# Patient Record
Sex: MALE | Race: BLACK OR AFRICAN AMERICAN | NOT HISPANIC OR LATINO | Employment: OTHER | URBAN - METROPOLITAN AREA
[De-identification: names, ages, dates, MRNs, and addresses within clinical notes are randomized per-mention and may not be internally consistent; named-entity substitution may affect disease eponyms.]

---

## 2024-09-30 ENCOUNTER — OFFICE VISIT (OUTPATIENT)
Dept: NEUROLOGY | Facility: CLINIC | Age: 24
End: 2024-09-30
Payer: COMMERCIAL

## 2024-09-30 VITALS
DIASTOLIC BLOOD PRESSURE: 71 MMHG | RESPIRATION RATE: 18 BRPM | WEIGHT: 211 LBS | SYSTOLIC BLOOD PRESSURE: 122 MMHG | HEART RATE: 90 BPM

## 2024-09-30 DIAGNOSIS — R56.9 SEIZURE (MULTI): Primary | ICD-10-CM

## 2024-09-30 PROCEDURE — 99214 OFFICE O/P EST MOD 30 MIN: CPT | Performed by: PSYCHIATRY & NEUROLOGY

## 2024-09-30 PROCEDURE — 1036F TOBACCO NON-USER: CPT | Performed by: PSYCHIATRY & NEUROLOGY

## 2024-09-30 PROCEDURE — 99204 OFFICE O/P NEW MOD 45 MIN: CPT | Performed by: PSYCHIATRY & NEUROLOGY

## 2024-09-30 RX ORDER — LAMOTRIGINE 25 MG/1
50 TABLET ORAL 2 TIMES DAILY
COMMUNITY
Start: 2024-06-05

## 2024-09-30 RX ORDER — LACOSAMIDE 100 MG/1
100 TABLET ORAL 2 TIMES DAILY
Qty: 180 TABLET | Refills: 3 | Status: SHIPPED | OUTPATIENT
Start: 2024-09-30 | End: 2025-09-25

## 2024-09-30 RX ORDER — SODIUM BICARBONATE 650 MG/1
650 TABLET ORAL 2 TIMES DAILY
COMMUNITY
Start: 2024-06-05

## 2024-09-30 ASSESSMENT — PAIN SCALES - GENERAL: PAINLEVEL: 0-NO PAIN

## 2024-09-30 NOTE — PATIENT INSTRUCTIONS
You were seen in Epilepsy clinic today with Dr. Arango,    -Lamotrigine 25mg twice daily for 1 week in addition to the Lacosamide 100mg twice daily  -After 1 week stop taking the lamotrigine all together, just take the Lacosamide 100mg twice daily  -Obtain an EKG to assess for abnormal heart rhythms  -Return to clinic in 3 months

## 2024-09-30 NOTE — PROGRESS NOTES
"24 y.o.male with a hx of autism, presenting in initial consultation for epilepsy.    Per chart review, it appears pt has experienced seizures since . He initially had an event in  where he dislocated his shoulder. His grandmother, who is pt's caregiver heard a \"thud\" and found him on his back in the other room, pale and diaphoretic. His eyes at the time appeared to roll back and he \"shivered\" for a few seconds. This was on and off for 3-4 minutes, no bowel/bladder incontinence or ictal tongue bite, no versions. No post-ictal confusion. Initially this was thought to be a syncopal event. The events continued to occur after the initial, requiring additional presentation to [facility]. He was admitted, MRI and EEG obtained which were overall unremarkable. Thought to be syncopal events, and pt was discharged without ASMs.     Established care in the [facility] Epilepsy center (Dr. Memo Horner) in 2021. Here pt was started on Keppra 750mg BID, then switched to Lamictal at pt's last tele-visit (2022). 2021 pt had breakthrough seizure, Lamictal was increased 50->100mg BID, however family noted adverse effects. They stated pt was a \"zombie\" on increased dose, not able to walk, sweating, and spitting.     Pt with recent seizure 24 in the rear seat of a car ictal tongue biting, and resulted in presentation to the [facility] ED, where he was stable and not admitted. Prior to this, pt was admitted to [facility] -24 for breakthrough seizure 2/2 medication noncompliance. Here it was decided to continue pt's Lamictal 25mg BID and increase to 50mg BID after one week.     Last Lamictal level was 2.5 24.    4D CLASSIFICATION -   Semiology: generalized Tonic->Clonic  Onset:   Frequency: 4 total seizures since   Last event: 24 pt presented to [facility] ED after having a seizure in the back of the car  EZ: Unknown  Etiology: Unknown  Comorbidities: Autism, MRDD    Workup:  EE2021 EEG IMPRESSIONS: This is a mildly " abnormal awake and drowsy electroencephalogram consistent with a mild encephalopathy.  There is no evidence for focal abnormality or epileptiform activity.  AMU: None  CTH: 12/2021: No acute intracranial process is identified. No acute facial bone fracture is identified. No acute cervical spine fracture is identified.   MRI w/wo: 04/20/2021 MRI brain with and without -IMPRESSION:   No acute intracranial abnormality.  The mesial temporal structures appear   symmetric in morphology, signal intensity and volume.  No abnormal   enhancement.   EKG-FL: None  Prior ASMs: Keppra  Current ASMs: Lamictal 50mg BID  Handedness: Left handed  -------------------------------------------------    ANAMNESIS  PER PATIENT:  Pt states he is going to faint prior to event, no memory of what happens during the event    PER WITNESS:   Passes out, saliva builds up, b/l UE/LE tense up, followed by shaking, 3-4 minutes, post ictal fatigue for rest of day, denies any asymmetry in movement and post-ictally, has not seen pt with abnormal eye movements or head turning      EPILEPSY RISK FACTORS:  Gestation and Birth: born at 9 months, normally   Febrile Seizures: Denies  Milestones: Language delay at 2, dx with Autism and MRDD  CNS Infections: Denies  CNS Surgeries: Denies  Head Trauma: Denies  FHx of Seizures: Denies    Currently Driving?: No  - Hx of MVA? No    REVIEW OF SYSTEMS  ROS: As per HPI. All other systems have been reviewed and are negative for complaint. No changes in the past medical, family, or social histories since the previous review on per the patient.    MEDICAL AND SURGICAL HISTORY  No past medical history on file.  No past surgical history on file.    FAMILY HISTORY  No family history on file.    SOCIAL HISTORY:  Home Situation: Lives with grandmother  Education: Graduated 12th grade  Employment: Unemployed, was working at [employer] until 2022 (6 months of employment until had seizure while working)  Tobacco: None  Alcohol:  None  Marijuana: None  Drugs: None  Caffeine: None  Hobbies: Drawing, video games    ALLERGIES  Not on File    MEDS  No current outpatient medications    EXAM   vitals were not taken for this visit.     General Appearance:  No acute distress, appears stated age.    Mental Status:  The patient was alert, pleasant and cooperative, able to follow 1 step commands, paucity of speech appreciated    Cranial Nerves:  Pupils were symmetrical and reacted briskly to light bilaterally. No visual field cuts were noted on confrontation testing.   EOMI, no pathological nystagmus.   Facial light-touch sensation and motor activation was symmetric bilaterally.   Tongue protruded mildline with intact bilateral movements.   Shoulder shrug symmetric.     Motor Exam:  Muscle tone was normal in upper and lower extremities, proximal and distal bilaterally.   Motor power was 5/5 in bilateral upper and lower extremities.  No tremor noted.    Sensory Exam:   Sensory system was intact. The sensory exam was intact to light touch throughout.    Coordination Exam:  Finger-to-nose and heel-to-shin testing without dysmetria.    Reflex Exam:  Biceps, triceps, brachioradialis, patellar, and achilles reflexes 2+ throughout.     ASSESSMENT AND PLAN  24 y.o.male presenting in initial consultation for episodes concerning for seizures. Sx onset 2021 and following with [facility]. His semiology is described above. Likely epileptic given hx of autism and semiology described as generalized tonic clonic seizures. Seizures controlled on Lamictal 50mg BID until 5/2024, having 2 events in May and August of 2024. Given increased frequency of seizures, will recommend switching anti-seizure medications. Pt unable to tolerate increased Lamictal dose, so will switch to different medication.    -Stop Lamictal, start lacosamide 100mg BID (Lamotrigine weaned off as 25mg BID for 1 week with overlap with lacosamide, followed by lacosamide 100mg BID)  -Recommend  obtaining EKG prior to initiation of lacosamide  -RTC in 3 months    Pt seen and examined with attending Dr. Arango who agrees with assessment and plan,    Jose Coleman, DO  PGY-2 Neurology

## 2025-01-27 ENCOUNTER — OFFICE VISIT (OUTPATIENT)
Dept: NEUROLOGY | Facility: CLINIC | Age: 25
End: 2025-01-27
Payer: COMMERCIAL

## 2025-01-27 VITALS
RESPIRATION RATE: 18 BRPM | WEIGHT: 211 LBS | SYSTOLIC BLOOD PRESSURE: 123 MMHG | DIASTOLIC BLOOD PRESSURE: 66 MMHG | HEART RATE: 82 BPM

## 2025-01-27 DIAGNOSIS — R56.9 SEIZURE (MULTI): ICD-10-CM

## 2025-01-27 PROCEDURE — 99214 OFFICE O/P EST MOD 30 MIN: CPT | Performed by: NURSE PRACTITIONER

## 2025-01-27 PROCEDURE — 1036F TOBACCO NON-USER: CPT | Performed by: NURSE PRACTITIONER

## 2025-01-27 RX ORDER — LACOSAMIDE 100 MG/1
100 TABLET ORAL 2 TIMES DAILY
Qty: 180 TABLET | Refills: 3 | Status: SHIPPED | OUTPATIENT
Start: 2025-01-27 | End: 2026-01-22

## 2025-01-27 ASSESSMENT — PAIN SCALES - GENERAL: PAINLEVEL_OUTOF10: 0-NO PAIN

## 2025-01-27 NOTE — PROGRESS NOTES
24 y.o.male with a hx of autism, presenting in epilepsy follow up.     4D CLASSIFICATION -   Semiology: generalized Tonic->Clonic  Onset:   Frequency: 4 total seizures since   Last event: 24 pt presented to [facility] ED after having a seizure in the back of the car  EZ: Unknown  Etiology: Unknown  Comorbidities: Autism, MRDD    Workup:  EE2021 EEG IMPRESSIONS: This is a mildly abnormal awake and drowsy electroencephalogram consistent with a mild encephalopathy.  There is no evidence for focal abnormality or epileptiform activity.  AMU: None  CTH: 2021: No acute intracranial process is identified. No acute facial bone fracture is identified. No acute cervical spine fracture is identified.   MRI w/wo: 2021 MRI brain with and without -IMPRESSION:   No acute intracranial abnormality.  The mesial temporal structures appear   symmetric in morphology, signal intensity and volume.  No abnormal   enhancement.   EKG-ND: None  Prior ASMs: Keppra, Lamictal   Current ASMs: lacosamide 100-100  Handedness: Left handed  -------------------------------------------------    ANAMNESIS  PER PATIENT:  Pt states he is going to faint prior to event, no memory of what happens during the event    PER WITNESS:   Passes out, saliva builds up, b/l UE/LE tense up, followed by shaking, 3-4 minutes, post ictal fatigue for rest of day, denies any asymmetry in movement and post-ictally, has not seen pt with abnormal eye movements or head turning    PRESENT CONCERNS:   Since transition to -100 he has been doing great. No side effects and no seizures since the transition. He and family member are very happy with the change. No new meds or changes since the last visit,.       EPILEPSY RISK FACTORS:  Gestation and Birth: born at 9 months, normally   Febrile Seizures: Denies  Milestones: Language delay at 2, dx with Autism and MRDD  CNS Infections: Denies  CNS Surgeries: Denies  Head Trauma: Denies  FHx of Seizures:  Denies    Currently Driving?: No  - Hx of MVA? No    REVIEW OF SYSTEMS  ROS: As per HPI. All other systems have been reviewed and are negative for complaint. No changes in the past medical, family, or social histories since the previous review on per the patient.      SOCIAL HISTORY:  Home Situation: Lives with grandmother  Education: Graduated 12th grade  Employment: Unemployed, was working at [employer] until 2022 (6 months of employment until had seizure while working)  Tobacco: None  Alcohol: None  Marijuana: None  Drugs: None  Caffeine: None  Hobbies: Drawing, video games    ALLERGIES  Not on File    MEDS  No current outpatient medications    EXAM  The patient was alert, pleasant and cooperative, able to follow 1 step commands, paucity of speech   Eye movements were intact. Muscles of mastication and facial expression moved normally. Hearing was normal bilaterally.   Coordination in the arms and legs was intact  Involuntary movements: none.  Standard gait was normal    ASSESSMENT AND PLAN  24 y.o.male presenting in follow up episodes concerning for seizures. Sx onset 2021.  His semiology is described above. Likely epileptic given hx of autism and semiology described as generalized tonic clonic seizures. Seizures controlled on Lamictal 50mg BID until 5/2024, having 2 events in May and August of 2024. Given increased frequency of seizures and inability to tolerate higher doses of LTG he was switched to -100. Since then he has had no seizures and no side effects, he is feeling improved on this medication.     -continue -100  -serum level   -RTC in 6 months

## 2025-01-27 NOTE — PATIENT INSTRUCTIONS
"Thank you for coming to the Epilepsy Clinic today.    -If you have any sudden new, concerning or worsening symptoms, call 911 and go to the Emergency Room. Otherwise, it was good seeing you today-  -  Your seizures are well controlled on the current medication. Additional prescription refills was sent to the pharmacy.    As we discussed, because you have not had any events or seizures where you lose awareness or control of your actions you have no restrictions at this time. However, if you have an event of seizure where this were to occur, you must inform our office, and we will reassess things. In this event, you stop driving, using heavy machinery, climbing heights, or engaging in any other activity that would cause harm/death to yourself or others were you to lose awareness/consciousness and have a seizure. These restrictions would need to stay in place until at least 6 months of seizure freedom and clearance your physician.    -HOW TO CONTACT KRISTIAN MILLS EPILEPSY NURSE PRACTITIONER ([phone]).   Instructed to call in the event of seizure, medication refills, or any questions  *Please allow 24-48 hours for non-urgent responses*.  For emergency concerns, please dial 911 or present to the nearest emergency room.  For concerns after business hours (8am-4:30pm) or on weekends please call [phone]  To call and schedule a follow up appointment please call [phone]  -Paperwork may take up to 3 business days to complete-    Every attempt is made to run on time for your appointment, if you are 15 minutes or later for your appoinement you may be asked to reschedule    -Compliance education: It is important to continue to try and achieve seizure control because of the potential for injury and illness due to seizures. In a very small minority of patients with generalized tonic clonic seizures (\"grand mal\"), breathing or heart function can stop during a seizure and result in demise (sudden unexpected death in " epilepsy or SUDEP). Freedom from seizures prevents this kind of outcome-     Please continue to take lacosamide (Vimpat) 100 mg twice a day. At higher doses, you may experience some double vision, dizziness, or drowsiness, though it would be unlikely at your current dose. A small group of patients with pre-existing heart conditions may have worsening of heart rhythms. While you have been stable on this medication, please let me know if you are ever seen or examined for heart issues.     I have ordered blood work for you to have completed at the lab. You do not need to bring any paperwork with you if you are going to a [facility]. The results will automatically come to me and I will call or message you with results.

## 2025-07-28 ENCOUNTER — OFFICE VISIT (OUTPATIENT)
Dept: NEUROLOGY | Facility: CLINIC | Age: 25
End: 2025-07-28
Payer: COMMERCIAL

## 2025-07-28 VITALS — SYSTOLIC BLOOD PRESSURE: 131 MMHG | HEART RATE: 75 BPM | DIASTOLIC BLOOD PRESSURE: 77 MMHG | WEIGHT: 198.41 LBS

## 2025-07-28 DIAGNOSIS — R56.9 SEIZURE (MULTI): ICD-10-CM

## 2025-07-28 PROCEDURE — 99214 OFFICE O/P EST MOD 30 MIN: CPT | Performed by: NURSE PRACTITIONER

## 2025-07-28 RX ORDER — LACOSAMIDE 100 MG/1
100 TABLET ORAL 2 TIMES DAILY
Qty: 180 TABLET | Refills: 1 | Status: SHIPPED | OUTPATIENT
Start: 2025-07-28 | End: 2026-01-24

## 2025-07-28 NOTE — PATIENT INSTRUCTIONS
"Thank you for coming to the Epilepsy Clinic today.  -If you have any sudden new, concerning or worsening symptoms, call 911 and go to the Emergency Room. Otherwise, it was good seeing you today-    -Please follow seizure precautions:   Please do not drive, operate any heavy machinery, swim unsupervised, please shower without collection of water instead of bathe. Be cautious around hot, heavy, or sharp objects. Do not cook with an open flame and do not perform any activities at heights such as on a ladder. These precautions should stay in place until 6 months seizure free and cleared by a provider.    -HOW TO CONTACT KRISTIAN MILLS EPILEPSY NURSE PRACTITIONER ([phone]).   Instructed to call in the event of seizure, medication refills, or any questions  *Please allow 24-48 hours for non-urgent responses*.  For emergency concerns, please dial 911 or present to the nearest emergency room.  For concerns after business hours (8am-4:30pm) or on weekends please call [phone]  To call and schedule a follow up appointment please call [phone]  -Paperwork may take up to 3 business days to complete-    Every attempt is made to run on time for your appointment, if you are 15 minutes or later for your appoinement you may be asked to reschedule    -Compliance education: It is important to continue to try and achieve seizure control because of the potential for injury and illness due to seizures. In a very small minority of patients with generalized tonic clonic seizures (\"grand mal\"), breathing or heart function can stop during a seizure and result in demise (sudden unexpected death in epilepsy or SUDEP). Millis from seizures prevents this kind of outcome-   "

## 2025-07-28 NOTE — PROGRESS NOTES
24 y.o.male with a hx of autism, presenting in epilepsy follow up.     4D CLASSIFICATION -   Semiology: generalized Tonic->Clonic  Onset:   Frequency: 4 total seizures since   Last event: 24 pt presented to [facility] ED after having a seizure in the back of the car  EZ: Unknown  Etiology: Unknown  Comorbidities: Autism, MRDD    Workup:  EE2021 EEG IMPRESSIONS: This is a mildly abnormal awake and drowsy electroencephalogram consistent with a mild encephalopathy.  There is no evidence for focal abnormality or epileptiform activity.  AMU: None  CTH: 2021: No acute intracranial process is identified. No acute facial bone fracture is identified. No acute cervical spine fracture is identified.   MRI w/wo: 2021 MRI brain with and without -IMPRESSION:   No acute intracranial abnormality.  The mesial temporal structures appear   symmetric in morphology, signal intensity and volume.  No abnormal   enhancement.   EKG-GA: None  Prior ASMs: Keppra, Lamictal   Current ASMs: lacosamide 100-100  Handedness: Left handed  -------------------------------------------------    ANAMNESIS  PER PATIENT:  Pt states he is going to faint prior to event, no memory of what happens during the event    PER WITNESS:   Passes out, saliva builds up, b/l UE/LE tense up, followed by shaking, 3-4 minutes, post ictal fatigue for rest of day, denies any asymmetry in movement and post-ictally, has not seen pt with abnormal eye movements or head turning    PRESENT CONCERNS:   Since transition to -100 he has been doing great. No side effects and no seizures since the transition. He and family member are very happy with the change. No new meds or changes since the last visit,.       EPILEPSY RISK FACTORS:  Gestation and Birth: born at 9 months, normally   Febrile Seizures: Denies  Milestones: Language delay at 2, dx with Autism and MRDD  CNS Infections: Denies  CNS Surgeries: Denies  Head Trauma: Denies  FHx of Seizures:  Denies    Currently Driving?: No  - Hx of MVA? No    REVIEW OF SYSTEMS  ROS: As per HPI. All other systems have been reviewed and are negative for complaint. No changes in the past medical, family, or social histories since the previous review on per the patient.      SOCIAL HISTORY:  Home Situation: Lives with grandmother  Education: Graduated 12th grade  Employment: Unemployed, was working at [employer] until 2022 (6 months of employment until had seizure while working)  Tobacco: None  Alcohol: None  Marijuana: None  Drugs: None  Caffeine: None  Hobbies: Drawing, video games    ALLERGIES  Not on File    MEDS  No current outpatient medications    EXAM  The patient was alert, pleasant and cooperative, able to follow 1 step commands, paucity of speech   Eye movements were intact. Muscles of mastication and facial expression moved normally. Hearing was normal bilaterally.   Coordination in the arms and legs was intact  Involuntary movements: none.  Standard gait was normal    ASSESSMENT AND PLAN  24 y.o.male presenting in follow up episodes concerning for seizures. Sx onset 2021.  His semiology is described above. Likely epileptic given hx of autism and semiology described as generalized tonic clonic seizures. Seizures controlled on Lamictal 50mg BID until 5/2024, having 2 events in May and August of 2024. Given increased frequency of seizures and inability to tolerate higher doses of LTG he was switched to -100. Since then he has had no seizures and no side effects, he is feeling improved on this medication.     -continue -100  -serum level   -RTC in 9 months